# Patient Record
Sex: FEMALE | Race: WHITE
[De-identification: names, ages, dates, MRNs, and addresses within clinical notes are randomized per-mention and may not be internally consistent; named-entity substitution may affect disease eponyms.]

---

## 2019-11-16 ENCOUNTER — HOSPITAL ENCOUNTER (INPATIENT)
Dept: HOSPITAL 8 - ED | Age: 49
LOS: 2 days | Discharge: HOME | DRG: 53 | End: 2019-11-18
Attending: HOSPITALIST | Admitting: HOSPITALIST
Payer: MEDICAID

## 2019-11-16 VITALS — SYSTOLIC BLOOD PRESSURE: 120 MMHG | DIASTOLIC BLOOD PRESSURE: 81 MMHG

## 2019-11-16 VITALS — WEIGHT: 145.06 LBS | BODY MASS INDEX: 24.77 KG/M2 | HEIGHT: 64 IN

## 2019-11-16 VITALS — SYSTOLIC BLOOD PRESSURE: 134 MMHG | DIASTOLIC BLOOD PRESSURE: 95 MMHG

## 2019-11-16 VITALS — SYSTOLIC BLOOD PRESSURE: 148 MMHG | DIASTOLIC BLOOD PRESSURE: 89 MMHG

## 2019-11-16 DIAGNOSIS — Z72.0: ICD-10-CM

## 2019-11-16 DIAGNOSIS — G43.909: ICD-10-CM

## 2019-11-16 DIAGNOSIS — F10.239: ICD-10-CM

## 2019-11-16 DIAGNOSIS — Z63.8: ICD-10-CM

## 2019-11-16 DIAGNOSIS — I10: ICD-10-CM

## 2019-11-16 DIAGNOSIS — G40.909: Primary | ICD-10-CM

## 2019-11-16 DIAGNOSIS — D72.829: ICD-10-CM

## 2019-11-16 DIAGNOSIS — F12.20: ICD-10-CM

## 2019-11-16 DIAGNOSIS — Z59.0: ICD-10-CM

## 2019-11-16 DIAGNOSIS — Z91.19: ICD-10-CM

## 2019-11-16 LAB
ALBUMIN SERPL-MCNC: 4.3 G/DL (ref 3.4–5)
ALP SERPL-CCNC: 59 U/L (ref 45–117)
ALT SERPL-CCNC: 19 U/L (ref 12–78)
ANION GAP SERPL CALC-SCNC: 9 MMOL/L (ref 5–15)
BASOPHILS # BLD AUTO: 0.08 X10^3/UL (ref 0–0.1)
BASOPHILS NFR BLD AUTO: 1 % (ref 0–1)
BILIRUB SERPL-MCNC: 0.6 MG/DL (ref 0.2–1)
CALCIUM SERPL-MCNC: 8.8 MG/DL (ref 8.5–10.1)
CHLORIDE SERPL-SCNC: 104 MMOL/L (ref 98–107)
CREAT SERPL-MCNC: 0.76 MG/DL (ref 0.55–1.02)
CULTURE INDICATED?: NO
EOSINOPHIL # BLD AUTO: 0.25 X10^3/UL (ref 0–0.4)
EOSINOPHIL NFR BLD AUTO: 2 % (ref 1–7)
ERYTHROCYTE [DISTWIDTH] IN BLOOD BY AUTOMATED COUNT: 13.6 % (ref 9.6–15.2)
LYMPHOCYTES # BLD AUTO: 2.75 X10^3/UL (ref 1–3.4)
LYMPHOCYTES NFR BLD AUTO: 16 % (ref 22–44)
MCH RBC QN AUTO: 32.8 PG (ref 27–34.8)
MCHC RBC AUTO-ENTMCNC: 33.1 G/DL (ref 32.4–35.8)
MCV RBC AUTO: 99 FL (ref 80–100)
MD: NO
MICROSCOPIC: (no result)
MONOCYTES # BLD AUTO: 0.83 X10^3/UL (ref 0.2–0.8)
MONOCYTES NFR BLD AUTO: 5 % (ref 2–9)
NEUTROPHILS # BLD AUTO: 13.1 X10^3/UL (ref 1.8–6.8)
NEUTROPHILS NFR BLD AUTO: 77 % (ref 42–75)
PLATELET # BLD AUTO: 339 X10^3/UL (ref 130–400)
PMV BLD AUTO: 9 FL (ref 7.4–10.4)
PROT SERPL-MCNC: 8 G/DL (ref 6.4–8.2)
RBC # BLD AUTO: 4.56 X10^6/UL (ref 3.82–5.3)
VANCOMYCIN TROUGH SERPL-MCNC: 6.1 MG/DL (ref 2.8–20)

## 2019-11-16 PROCEDURE — 70553 MRI BRAIN STEM W/O & W/DYE: CPT

## 2019-11-16 PROCEDURE — 93005 ELECTROCARDIOGRAM TRACING: CPT

## 2019-11-16 PROCEDURE — 36415 COLL VENOUS BLD VENIPUNCTURE: CPT

## 2019-11-16 PROCEDURE — 85025 COMPLETE CBC W/AUTO DIFF WBC: CPT

## 2019-11-16 PROCEDURE — 70450 CT HEAD/BRAIN W/O DYE: CPT

## 2019-11-16 PROCEDURE — 71045 X-RAY EXAM CHEST 1 VIEW: CPT

## 2019-11-16 PROCEDURE — 90686 IIV4 VACC NO PRSV 0.5 ML IM: CPT

## 2019-11-16 PROCEDURE — 80053 COMPREHEN METABOLIC PANEL: CPT

## 2019-11-16 PROCEDURE — 81003 URINALYSIS AUTO W/O SCOPE: CPT

## 2019-11-16 PROCEDURE — 80048 BASIC METABOLIC PNL TOTAL CA: CPT

## 2019-11-16 PROCEDURE — 80307 DRUG TEST PRSMV CHEM ANLYZR: CPT

## 2019-11-16 PROCEDURE — A9575 INJ GADOTERATE MEGLUMI 0.1ML: HCPCS

## 2019-11-16 RX ADMIN — ENOXAPARIN SODIUM SCH MG: 40 INJECTION SUBCUTANEOUS at 12:31

## 2019-11-16 RX ADMIN — LEVETIRACETAM SCH MG: 500 TABLET ORAL at 12:30

## 2019-11-16 RX ADMIN — LEVETIRACETAM SCH MG: 500 TABLET ORAL at 20:22

## 2019-11-16 RX ADMIN — NICOTINE SCH PATCH: 14 PATCH, EXTENDED RELEASE TRANSDERMAL at 12:31

## 2019-11-16 SDOH — ECONOMIC STABILITY - HOUSING INSECURITY: HOMELESSNESS: Z59.0

## 2019-11-16 SDOH — SOCIAL STABILITY - SOCIAL INSECURITY: OTHER SPECIFIED PROBLEMS RELATED TO PRIMARY SUPPORT GROUP: Z63.8

## 2019-11-16 NOTE — NUR
LATE NOTE ENTRY DUE TO PT CARE. Pt ambulates with steady gait and balance with 
NADN and no defecits observed. Pt ambulates to bathroom with UA cup provided.

## 2019-11-16 NOTE — NUR
LATE NOTE ENTRY DUE TO PT CARE. Pt presents to ED by EMS after bystanders 
witnessed pt have a focal seizure. Bystanders told EMS pt has "spaced out" and 
associated tremors. Pt states recent diagnosis of seizures within the last 
year, with this incident being her second seizure. Pt states, "I havn't taken 
Keppra in months". Pt denies head trauma, oral trauma, or loss of bowel or 
bladder control. Pt resting on gurney connected to NIBP cuff, continous pulse 
ox monitor, and cardiac monitor. Bedrails up x 2 with seizure precautions in 
place. Call light within reach. PIV and labs obtained. NADN. Pt is AOX4 GCS of 
15. No other needs requested. Pt provifed warm blankets for comfort measures.

## 2019-11-17 VITALS — SYSTOLIC BLOOD PRESSURE: 120 MMHG | DIASTOLIC BLOOD PRESSURE: 81 MMHG

## 2019-11-17 VITALS — SYSTOLIC BLOOD PRESSURE: 128 MMHG | DIASTOLIC BLOOD PRESSURE: 85 MMHG

## 2019-11-17 VITALS — SYSTOLIC BLOOD PRESSURE: 119 MMHG | DIASTOLIC BLOOD PRESSURE: 79 MMHG

## 2019-11-17 VITALS — DIASTOLIC BLOOD PRESSURE: 78 MMHG | SYSTOLIC BLOOD PRESSURE: 115 MMHG

## 2019-11-17 LAB
ANION GAP SERPL CALC-SCNC: 4 MMOL/L (ref 5–15)
BASOPHILS # BLD AUTO: 0.04 X10^3/UL (ref 0–0.1)
BASOPHILS NFR BLD AUTO: 0 % (ref 0–1)
CALCIUM SERPL-MCNC: 8.8 MG/DL (ref 8.5–10.1)
CHLORIDE SERPL-SCNC: 109 MMOL/L (ref 98–107)
CREAT SERPL-MCNC: 0.74 MG/DL (ref 0.55–1.02)
EOSINOPHIL # BLD AUTO: 0.23 X10^3/UL (ref 0–0.4)
EOSINOPHIL NFR BLD AUTO: 2 % (ref 1–7)
ERYTHROCYTE [DISTWIDTH] IN BLOOD BY AUTOMATED COUNT: 13.6 % (ref 9.6–15.2)
LYMPHOCYTES # BLD AUTO: 2.32 X10^3/UL (ref 1–3.4)
LYMPHOCYTES NFR BLD AUTO: 23 % (ref 22–44)
MCH RBC QN AUTO: 32.6 PG (ref 27–34.8)
MCHC RBC AUTO-ENTMCNC: 32.8 G/DL (ref 32.4–35.8)
MCV RBC AUTO: 99.5 FL (ref 80–100)
MD: NO
MONOCYTES # BLD AUTO: 0.66 X10^3/UL (ref 0.2–0.8)
MONOCYTES NFR BLD AUTO: 6 % (ref 2–9)
NEUTROPHILS # BLD AUTO: 7.02 X10^3/UL (ref 1.8–6.8)
NEUTROPHILS NFR BLD AUTO: 68 % (ref 42–75)
PLATELET # BLD AUTO: 263 X10^3/UL (ref 130–400)
PMV BLD AUTO: 8.9 FL (ref 7.4–10.4)
RBC # BLD AUTO: 4.38 X10^6/UL (ref 3.82–5.3)

## 2019-11-17 RX ADMIN — ACETAMINOPHEN PRN MG: 325 TABLET, FILM COATED ORAL at 10:29

## 2019-11-17 RX ADMIN — NICOTINE SCH PATCH: 14 PATCH, EXTENDED RELEASE TRANSDERMAL at 13:22

## 2019-11-17 RX ADMIN — LEVETIRACETAM SCH MG: 500 TABLET ORAL at 10:29

## 2019-11-17 RX ADMIN — ENOXAPARIN SODIUM SCH MG: 40 INJECTION SUBCUTANEOUS at 13:22

## 2019-11-17 RX ADMIN — LEVETIRACETAM SCH MG: 500 TABLET ORAL at 20:28

## 2019-11-17 RX ADMIN — ACETAMINOPHEN PRN MG: 325 TABLET, FILM COATED ORAL at 05:21

## 2019-11-17 RX ADMIN — Medication SCH TAB: at 10:29

## 2019-11-18 VITALS — SYSTOLIC BLOOD PRESSURE: 129 MMHG | DIASTOLIC BLOOD PRESSURE: 82 MMHG

## 2019-11-18 VITALS — DIASTOLIC BLOOD PRESSURE: 66 MMHG | SYSTOLIC BLOOD PRESSURE: 102 MMHG

## 2019-11-18 VITALS — DIASTOLIC BLOOD PRESSURE: 82 MMHG | SYSTOLIC BLOOD PRESSURE: 129 MMHG

## 2019-11-18 VITALS — SYSTOLIC BLOOD PRESSURE: 122 MMHG | DIASTOLIC BLOOD PRESSURE: 62 MMHG

## 2019-11-18 RX ADMIN — ACETAMINOPHEN PRN MG: 325 TABLET, FILM COATED ORAL at 07:54

## 2019-11-18 RX ADMIN — ACETAMINOPHEN PRN MG: 325 TABLET, FILM COATED ORAL at 11:55

## 2019-11-18 RX ADMIN — Medication SCH TAB: at 07:54

## 2019-11-18 RX ADMIN — LEVETIRACETAM SCH MG: 500 TABLET ORAL at 07:55
